# Patient Record
(demographics unavailable — no encounter records)

---

## 2025-05-20 NOTE — ASSESSMENT
[FreeTextEntry1] : , ,  Preventative: Counseled on health promotion and disease prevention. EKG and wellness labs  Gastroenterology referral provided for colonoscopy Discussed routine immunizations  Heart Screen:  EKG nsr PSA screen  Obesity: Zepbound Counseling on diet, exercise and lifestyle modifications. Weight loss goals discussed Nutritionist dayana   HTN: Olmesartan HCTZ weight loss Lw salt diet   Prediabetes: check HbA1C % Counseled patient to cut down on processed sugar and carbohydrates. Increase Aerobic exercise and resistance training.   GERD: Counseled on dietary modifications. Avoid tomato products, mint, citrus (drinks / fruit), fried fatty foods, caffeine, chocolate. Avoid food / drink ,2 hours prior to bedtime / napping -Trial of PPI -Estefani / Rolaids PRN -Gastro eval / H Pylori testing  if not improving     [Vaccines Reviewed] : Immunizations reviewed today. Please see immunization details in the vaccine log within the immunization flowsheet.